# Patient Record
Sex: MALE | Race: WHITE | URBAN - METROPOLITAN AREA
[De-identification: names, ages, dates, MRNs, and addresses within clinical notes are randomized per-mention and may not be internally consistent; named-entity substitution may affect disease eponyms.]

---

## 2017-07-10 ENCOUNTER — TRANSFERRED RECORDS (OUTPATIENT)
Dept: HEALTH INFORMATION MANAGEMENT | Facility: CLINIC | Age: 37
End: 2017-07-10

## 2017-07-20 ENCOUNTER — TRANSFERRED RECORDS (OUTPATIENT)
Dept: HEALTH INFORMATION MANAGEMENT | Facility: CLINIC | Age: 37
End: 2017-07-20

## 2017-07-21 ENCOUNTER — APPOINTMENT (OUTPATIENT)
Dept: CT IMAGING | Facility: CLINIC | Age: 37
End: 2017-07-21
Payer: MEDICAID

## 2017-07-21 ENCOUNTER — HOSPITAL ENCOUNTER (EMERGENCY)
Facility: CLINIC | Age: 37
Discharge: OTHER INSTITUTION | End: 2017-07-21
Attending: INTERNAL MEDICINE | Admitting: INTERNAL MEDICINE
Payer: MEDICAID

## 2017-07-21 VITALS
OXYGEN SATURATION: 96 % | DIASTOLIC BLOOD PRESSURE: 78 MMHG | SYSTOLIC BLOOD PRESSURE: 112 MMHG | RESPIRATION RATE: 16 BRPM | HEART RATE: 85 BPM | TEMPERATURE: 97.3 F

## 2017-07-21 DIAGNOSIS — R51.9 FACIAL PAIN: ICD-10-CM

## 2017-07-21 DIAGNOSIS — F10.920 ALCOHOL INTOXICATION, UNCOMPLICATED (H): ICD-10-CM
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
## 2017-07-21 DIAGNOSIS — F10.220 ALCOHOL DEPENDENCE WITH UNCOMPLICATED INTOXICATION (H): ICD-10-CM

## 2017-07-21 DIAGNOSIS — F11.20 UNCOMPLICATED OPIOID DEPENDENCE (H): ICD-10-CM

## 2017-07-21 LAB
ALCOHOL BREATH TEST: 0.1 (ref 0–0.01)
AMPHETAMINES UR QL SCN: NORMAL
BARBITURATES UR QL: NORMAL
BENZODIAZ UR QL: NORMAL
CANNABINOIDS UR QL SCN: NORMAL
COCAINE UR QL: NORMAL
ETHANOL UR QL SCN: NORMAL
OPIATES UR QL SCN: NORMAL

## 2017-07-21 PROCEDURE — 80320 DRUG SCREEN QUANTALCOHOLS: CPT | Performed by: EMERGENCY MEDICINE

## 2017-07-21 PROCEDURE — 80307 DRUG TEST PRSMV CHEM ANLYZR: CPT | Performed by: EMERGENCY MEDICINE

## 2017-07-21 PROCEDURE — 99284 EMERGENCY DEPT VISIT MOD MDM: CPT | Mod: GC | Performed by: INTERNAL MEDICINE

## 2017-07-21 PROCEDURE — 99285 EMERGENCY DEPT VISIT HI MDM: CPT | Mod: 25 | Performed by: INTERNAL MEDICINE

## 2017-07-21 PROCEDURE — 80307 DRUG TEST PRSMV CHEM ANLYZR: CPT | Mod: 59 | Performed by: EMERGENCY MEDICINE

## 2017-07-21 PROCEDURE — 70450 CT HEAD/BRAIN W/O DYE: CPT

## 2017-07-21 PROCEDURE — 82075 ASSAY OF BREATH ETHANOL: CPT | Performed by: INTERNAL MEDICINE

## 2017-07-21 ASSESSMENT — ENCOUNTER SYMPTOMS
SORE THROAT: 0
HEADACHES: 1
WEAKNESS: 0
MYALGIAS: 0
DIARRHEA: 0
ABDOMINAL PAIN: 0
NAUSEA: 0
BRUISES/BLEEDS EASILY: 0
NUMBNESS: 0
DYSPHORIC MOOD: 0
HEMATURIA: 0
ADENOPATHY: 0
PALPITATIONS: 0
CONSTIPATION: 0
ARTHRALGIAS: 0
CHILLS: 0
DYSURIA: 0
COUGH: 0
SHORTNESS OF BREATH: 0
FEVER: 0
EYE PAIN: 0
VOMITING: 0

## 2017-07-21 NOTE — DISCHARGE INSTRUCTIONS
"You were seen in the Emergency Department today for concerns of alcohol dependence. Because you hit your head this morning, we obtained a head CT, which was normal. We have arranged a Detox bed for you at [address]. You should follow up afterwards to obtain a Rule 25 assessment.        Alcohol Intoxication  Alcohol intoxication occurs when you drink alcohol faster than your liver can remove it from your system. The following facts are important to remember:    It can take 10 minutes or more to start to feel the effects of a drink, so you can easily get more intoxicated than you intended.    One drink may be more than 1 serving of alcohol. Depending on the drink, it can be 2 to 4 servings.    It takes about an hour for your body to metabolize (clear) 1 serving. If you have more than 1 drink, it can take a couple of hours or more.    Many things affect how drinks will affect you, including whether you ve eaten, how fast you drink, your size, how much you normally drink (or not), medicines you take, chronic diseases you have, and gender.  Signs and symptoms of alcohol poisoning  The following are signs and symptoms of alcohol poisoning:  Mild impairment    Reduced inhibitions    Slurred speech    Drowsiness    Decreased fine motor skills  Moderate impairment    Erratic behavior, aggression, depression    Impaired judgment    Confusion    Concentration difficulties    Coordination problems  Severe impairment    Vomiting    Seizures    Unconsciousness    Cold, clammy    Slow or irregular breathing    Hypothermia (low body temperature)    Coma  Health effects  Alcohol abuse causes health problems. Sometimes this can happen after only drinking a  little.\" There is no set number of drinks or amount of alcohol that defines too much. The more you drink at one time, and the more frequently you drink determine both the short-term and long-term health effects. It affects all parts of your body and your health, including " your:    Brain. Alcohol is a central nervous system depressant. It can damage parts of the brain that affect your balance, memory, thinking, and emotions. It can cause memory loss, blackouts, depression, agitation, sleep cycle changes, and seizures. These changes may or may not be reversible.    Heart and vascular system. Alcohol affects multiple areas. It can damage heart muscle causing cardiomyopathy, which is a weakening and stretching of the heart muscle. This can lead to trouble breathing, an irregular heartbeat, atrial fibrillation, leg swelling, and heart failure. It makes the blood vessels stiffen causing hypertension (high blood pressure). All of these problems increase your risk of having heart attacks or strokes.    Liver. Alcohol causes fat to build up in the liver, affecting its normal function. This increases the risk for hepatitis, leading to abdominal pain, appetite loss, jaundice, bleeding problems, liver fibrosis, and cirrhosis. This in turn can affect your ability to fight off infections, and can cause diabetes. The liver changes prevent it from removing toxins in your blood that can cause encephalopathy. Signs of this are confusion, altered level of consciousness, personality changes, memory loss, seizures, coma, and death.    Pancreas. Alcohol can cause inflammation of the pancreas, or pancreatitis. This can cause pain in your abdomen, fever, and diabetes.    Immune system. Alcohol weakens your immune system in a number of ways. It suppresses your immune system making it harder to fight off infections and colds. You will also have a higher risk of certain infections like pneumonia and tuberculosis.    Cancer risk. Alcohol raises your risk of cancer of the mouth, esophagus, pharynx, larynx, liver, and breast.    Sexual function. Alcohol abuse can also lead to sexual problems.  Alcohol use during pregnancy may cause permanent damage to the growing baby.  Home care  The following guidelines will  help you care for yourself at home:    Don't drink any more alcohol.    Don't drive until all effects of the alcohol have worn off.    Don't operate machinery that can cause injuries.    Get lots of rest over the next few days. Drink plenty of water and other non-alcoholic liquids. Try to eat regular meals.    If you have been drinking heavily on a daily basis, you may go through alcohol withdrawal. The usual symptoms last 3 to 4 days and may include nervousness, shakiness, nausea, sweating, sleeplessness, and can even cause seizures and a serious withdrawal symptom called delirium tremens, or DTs. During this time, it is best that you stay with family or friends who can help and support you. You can also admit yourself to a residential detox program. If your symptoms are severe (seizures, severe shakiness, confusion), contact your doctor or call an ambulance for help (see below).   Follow-up care  If alcohol is a problem in your life, these are some organizations that can help you:    Alcoholics Anonymous offers support through a self-help fellowship. There are no dues or fees. See the Yellow Pages and call for time and place of meetings. Find AA online at www.aa.org.    [employer] offers support to families of alcohol users. Contact 361-460-9021, or online at www.al-anon.org.    National [place] on Alcoholism and Drug Dependence can be reached at 845-971-1120, or online at www.ncadd.org.    There are also inpatient and residential alcohol detox programs. Check the Internet or phonebook Yellow Pages under  Drug Abuse and Treatment Centers.   Call 911  Call 911 if any of these occur:    Trouble breathing or slow irregular breathing    Chest pain    Sudden weakness on one side of your body or sudden trouble speaking    Heavy bleeding or vomiting blood    Very drowsy or trouble awakening    Fainting or loss of consciousness    Rapid heart rate    Seizure  When to seek medical advice  Call your healthcare provider right  away if any of these occur:    Severe shakiness     Fever over 100.4  F (38.0  C)    Confusion or hallucinations (seeing, hearing, or feeling things that are not there)    Pain in your upper abdomen that gets worse    Repeated vomiting  Date Last Reviewed: 6/1/2016 2000-2017 [employer]. [address] [place] [address]. All rights reserved. This information is not intended as a substitute for professional medical care. Always follow your healthcare professional's instructions.

## 2017-07-21 NOTE — ED PROVIDER NOTES
History     Chief Complaint   Patient presents with     Alcohol Intoxication     HPI  Sanket Jacques is a 37 year old male with a history of alcohol and opiate dependence who presents seeking detox from alcohol.    The patient has a long history history of alcohol use, starting at age 12. He is currently drinking 1 L of vodka per day. He was seen at [facility] [place] [place] x2 yesterday for alcohol intoxication, although the patient cannot recall the details of these visits. Per his report, he was discharged with instructions to obtain a Rule 25. He went to [facility] for a Rule 25, but he arrived too late to be seen. This frustrated him, and he left to drink alcohol. After he became intoxicated, he walked into a clinic and sat on the floor. The staff at the clinic called EMS, and he was brought to the ED. He estimates that his last alcohol use was at 0900. He denies anxiety, diaphoresis, tremors, or any withdrawal symptoms.     He was told in the past by a friend that he had a seizure, but he is unsure of the details. He has no known history of delirium tremens. He has been through CD treatment 2-3 times in the past for polysubstance dependence. He was sober from alcohol for one month in 01/2017 until 02/2017. Prior to that, he had a 6-7 month stretch of sobriety. He has a history of opiate dependence, and his last opiate use was 8 days ago. He has injected heroin once, but he typically snorts opiates. He has tried other substances in the past, but he doesn't use anything else with regularlity. His last use of another substance was Xanax two days ago. He thinks he fell and hit his head this morning after becoming intoxicated. He currently has a headache, and he denies numbness or weakness.     I have reviewed the Medications, Allergies, Past Medical and Surgical History, and Social History in the Epic system.    Review of Systems   Constitutional: Negative for chills and fever.   HENT: Negative for congestion and sore throat.     Eyes: Negative for pain and visual disturbance.   Respiratory: Negative for cough and shortness of breath.    Cardiovascular: Negative for chest pain and palpitations.   Gastrointestinal: Negative for abdominal pain, constipation, diarrhea, nausea and vomiting.   Genitourinary: Negative for dysuria and hematuria.   Musculoskeletal: Negative for arthralgias and myalgias.   Neurological: Positive for headaches. Negative for weakness and numbness.        Positive for mild bilateral hand tremor (baseline)   Hematological: Negative for adenopathy. Does not bruise/bleed easily.   Psychiatric/Behavioral: Negative for dysphoric mood and suicidal ideas.       Physical Exam   BP: 107/61  Pulse: 79  Temp: 98  F (36.7  C)  Resp: 16  SpO2: 96 %  Physical Exam   Constitutional: He is oriented to person, place, and time.   Laying in bed reading a book, appears stated age, comfortable   HENT:   Head: Normocephalic and atraumatic.   Eyes: Conjunctivae and EOM are normal.   Neck: Neck supple.   Cardiovascular: Normal rate, regular rhythm and normal heart sounds.    Pulmonary/Chest: Effort normal and breath sounds normal. No respiratory distress.   Abdominal: Soft. Bowel sounds are normal. He exhibits no distension. There is no tenderness.   Musculoskeletal: Normal range of motion.   Neurological: He is alert and oriented to person, place, and time. No cranial nerve deficit. Coordination normal.   Strength 5/5 throughout. Mild resting tremor in hands bilaterally   Skin: Skin is warm and dry. He is not diaphoretic.   Psychiatric:   Slurred speech, somewhat tangential thought process, appears intoxicated       ED Course     ED Course     Procedures          Labs Ordered and Resulted from Time of ED Arrival Up to the Time of Departure from the ED   ALCOHOL BREATH TEST POCT - Abnormal; Notable for the following:        Result Value    Alcohol Breath Test 0.097 (*)     All other components within normal limits   DRUG ABUSE SCREEN 6 CHEM  DEP URINE ([facility])     Results for orders placed or performed during the hospital encounter of 07/21/17   Head CT w/o contrast    Narrative    CT HEAD W/O CONTRAST  7/21/2017 3:40 PM    HISTORY: Head trauma. Pain.    TECHNIQUE: Scans were obtained through the head without IV contrast.   Radiation dose for this scan was reduced using automated exposure  control, adjustment of the mA and/or kV according to patient size, or  iterative reconstruction technique.    COMPARISON: None.    FINDINGS: No hemorrhage, mass lesion, or focal area of acute  infarction identified. Tiny cyst or polyp in the left maxillary  antrum.. Prior left-sided mastoidectomy with clear mastoid bowl..      Impression    IMPRESSION:   1. Brain normal.  2. Prior left mastoidectomy.    KARTHIK RAPP MD   Drug abuse screen 6 urine (chem dep)   Result Value Ref Range    Amphetamine Qual Urine  NEG     Negative   Cutoff for a negative amphetamine is 500 ng/mL or less.      Barbiturates Qual Urine  NEG     Negative   Cutoff for a negative barbiturate is 200 ng/mL or less.      Benzodiazepine Qual Urine  NEG     Negative   Cutoff for a negative benzodiazepine is 200 ng/mL or less.      Cannabinoids Qual Urine  NEG     Negative   Cutoff for a negative cannabinoid is 50 ng/mL or less.      Cocaine Qual Urine  NEG     Negative   Cutoff for a negative cocaine is 300 ng/mL or less.      Ethanol Qual Urine  NEG     Negative   Cutoff for a negative urine ethanol is 0.05 g/dL or less      Opiates Qualitative Urine  NEG     Negative   Cutoff for a negative opiate is 300 ng/mL or less.     Alcohol breath test POCT   Result Value Ref Range    Alcohol Breath Test 0.097 (A) 0.00 - 0.01          Assessments & Plan (with Medical Decision Making)   Sanket Jacques is a 37 year old male with a history of alcohol and opiate dependence who presents seeking detox from alcohol. The patient reports that his current alcohol use is 1 L vodka/day. His last drink was approximately  0900. ETOH breath test was 0.097. Utox was negative. He is requesting detox, with the intent to obtain a Rule 25 afterwards. He has a historical diagnosis of bipolar disorder type 2. He denies any suicidal ideation. His VS were wnl. He clinically appears intoxicated, and he denies withdrawal symptoms. He has a mild resting tremor, which he reports is his baseline. He was accepted for a bed at [address] [place].      The patient states that he thinks he fell and hit his head this morning after becoming intoxicated, and he currently has a headache. There is no trauma identified on exam, and his neuro exam was normal. Given his possible head injury and current intoxication, we obtained a head CT, which returned as normal.    Plan:   -Detox at [address] [place]   -Rule 25 after detox      Brandon Cole MD, PGY1    I have reviewed the nursing notes.    I have reviewed the findings, diagnosis, plan and need for follow up with the patient.  This data collected with the Resident working in the Emergency Department.  Patient was seen and evaluated by myself and I repeated the history and physical exam with the patient.  The plan of care was discussed with them.  The key portions of the note including the entire assessment and plan reflect my documentation.         New Prescriptions    No medications on file       Final diagnoses:   Alcohol intoxication, uncomplicated (H)       7/21/2017   [facility], [place], EMERGENCY DEPARTMENT     Xena Pineda MD  07/21/17 1600

## 2017-07-21 NOTE — ED AVS SNAPSHOT
[facility], [place], Emergency Department    [address]    [place] [address]    Phone:  [phone]    Fax:  [phone]                                       Sanket Jacques   MRN: [id number]    Department:  [facility], Emergency Department   Date of Visit:  7/21/2017           After Visit Summary Signature Page     I have received my discharge instructions, and my questions have been answered. I have discussed any challenges I see with this plan with the nurse or doctor.    ..........................................................................................................................................  Patient/Patient Representative Signature      ..........................................................................................................................................  Patient Representative Print Name and Relationship to Patient    ..................................................               ................................................  Date                                            Time    ..........................................................................................................................................  Reviewed by Signature/Title    ...................................................              ..............................................  Date                                                            Time

## 2017-07-21 NOTE — ED AVS SNAPSHOT
[facility], Emergency Department    [address]    [place] [address]    Phone:  [phone]    Fax:  [phone]                                       Sanket Jacques   MRN: [id number]    Department:  [facility], Emergency Department   Date of Visit:  7/21/2017           Patient Information     Date Of Birth          1980        Your diagnoses for this visit were:     Alcohol intoxication, uncomplicated (H)        You were seen by Xena Pineda MD.        Discharge Instructions       You were seen in the Emergency Department today for concerns of alcohol dependence. Because you hit your head this morning, we obtained a head CT, which was normal. We have arranged a Detox bed for you at [address]. You should follow up afterwards to obtain a Rule 25 assessment.        Alcohol Intoxication  Alcohol intoxication occurs when you drink alcohol faster than your liver can remove it from your system. The following facts are important to remember:    It can take 10 minutes or more to start to feel the effects of a drink, so you can easily get more intoxicated than you intended.    One drink may be more than 1 serving of alcohol. Depending on the drink, it can be 2 to 4 servings.    It takes about an hour for your body to metabolize (clear) 1 serving. If you have more than 1 drink, it can take a couple of hours or more.    Many things affect how drinks will affect you, including whether you ve eaten, how fast you drink, your size, how much you normally drink (or not), medicines you take, chronic diseases you have, and gender.  Signs and symptoms of alcohol poisoning  The following are signs and symptoms of alcohol poisoning:  Mild impairment    Reduced inhibitions    Slurred speech    Drowsiness    Decreased fine motor skills  Moderate impairment    Erratic behavior, aggression, depression    Impaired judgment    Confusion    Concentration difficulties    Coordination problems  Severe  "impairment    Vomiting    Seizures    Unconsciousness    Cold, clammy    Slow or irregular breathing    Hypothermia (low body temperature)    Coma  Health effects  Alcohol abuse causes health problems. Sometimes this can happen after only drinking a  little.\" There is no set number of drinks or amount of alcohol that defines too much. The more you drink at one time, and the more frequently you drink determine both the short-term and long-term health effects. It affects all parts of your body and your health, including your:    Brain. Alcohol is a central nervous system depressant. It can damage parts of the brain that affect your balance, memory, thinking, and emotions. It can cause memory loss, blackouts, depression, agitation, sleep cycle changes, and seizures. These changes may or may not be reversible.    Heart and vascular system. Alcohol affects multiple areas. It can damage heart muscle causing cardiomyopathy, which is a weakening and stretching of the heart muscle. This can lead to trouble breathing, an irregular heartbeat, atrial fibrillation, leg swelling, and heart failure. It makes the blood vessels stiffen causing hypertension (high blood pressure). All of these problems increase your risk of having heart attacks or strokes.    Liver. Alcohol causes fat to build up in the liver, affecting its normal function. This increases the risk for hepatitis, leading to abdominal pain, appetite loss, jaundice, bleeding problems, liver fibrosis, and cirrhosis. This in turn can affect your ability to fight off infections, and can cause diabetes. The liver changes prevent it from removing toxins in your blood that can cause encephalopathy. Signs of this are confusion, altered level of consciousness, personality changes, memory loss, seizures, coma, and death.    Pancreas. Alcohol can cause inflammation of the pancreas, or pancreatitis. This can cause pain in your abdomen, fever, and diabetes.    Immune system. Alcohol " weakens your immune system in a number of ways. It suppresses your immune system making it harder to fight off infections and colds. You will also have a higher risk of certain infections like pneumonia and tuberculosis.    Cancer risk. Alcohol raises your risk of cancer of the mouth, esophagus, pharynx, larynx, liver, and breast.    Sexual function. Alcohol abuse can also lead to sexual problems.  Alcohol use during pregnancy may cause permanent damage to the growing baby.  Home care  The following guidelines will help you care for yourself at home:    Don't drink any more alcohol.    Don't drive until all effects of the alcohol have worn off.    Don't operate machinery that can cause injuries.    Get lots of rest over the next few days. Drink plenty of water and other non-alcoholic liquids. Try to eat regular meals.    If you have been drinking heavily on a daily basis, you may go through alcohol withdrawal. The usual symptoms last 3 to 4 days and may include nervousness, shakiness, nausea, sweating, sleeplessness, and can even cause seizures and a serious withdrawal symptom called delirium tremens, or DTs. During this time, it is best that you stay with family or friends who can help and support you. You can also admit yourself to a residential detox program. If your symptoms are severe (seizures, severe shakiness, confusion), contact your doctor or call an ambulance for help (see below).   Follow-up care  If alcohol is a problem in your life, these are some organizations that can help you:    Alcoholics Anonymous offers support through a self-help fellowship. There are no dues or fees. See the Yellow Pages and call for time and place of meetings. Find AA online at www.aa.org.    [employer] offers support to families of alcohol users. Contact 535-212-3783, or online at www.al-anodameon.org.    National [place] on Alcoholism and Drug Dependence can be reached at 567-099-4996, or online at www.ncadd.org.    There are also  inpatient and residential alcohol detox programs. Check the Internet or phonebook Yellow Pages under  Drug Abuse and Treatment Centers.   Call 911  Call 911 if any of these occur:    Trouble breathing or slow irregular breathing    Chest pain    Sudden weakness on one side of your body or sudden trouble speaking    Heavy bleeding or vomiting blood    Very drowsy or trouble awakening    Fainting or loss of consciousness    Rapid heart rate    Seizure  When to seek medical advice  Call your healthcare provider right away if any of these occur:    Severe shakiness     Fever over 100.4  F (38.0  C)    Confusion or hallucinations (seeing, hearing, or feeling things that are not there)    Pain in your upper abdomen that gets worse    Repeated vomiting  Date Last Reviewed: 6/1/2016 2000-2017 [employer]. [address] [place] [address], [place] [address]. All rights reserved. This information is not intended as a substitute for professional medical care. Always follow your healthcare professional's instructions.          24 Hour Appointment Hotline       To make an appointment at any [facility] (formerly [facility]), call [phone] ([phone]). If you don't have a family doctor or clinic, we will help you find one. [facility] clinics are conveniently located to serve the needs of you and your family.             Review of your medicines      Our records show that you are taking the medicines listed below. If these are incorrect, please call your family doctor or clinic.        Dose / Directions Last dose taken    citalopram 10 MG tablet   Commonly known as:  celeXA   Dose:  10 mg   Quantity:  30 tablet        Take 1 tablet by mouth daily.   Refills:  3        HYDROcodone-acetaminophen 5-325 MG per tablet   Commonly known as:  NORCO   Dose:  1 tablet   Quantity:  20 tablet        Take 1 tablet by mouth every 6 hours as needed for pain. Last refill.  Needs to be seen in clinic for recheck for next refill.   Refills:  0         "oxyCODONE-acetaminophen 5-325 MG per tablet   Commonly known as:  PERCOCET   Dose:  1-2 tablet   Quantity:  20 tablet        Take 1-2 tablets by mouth every 6 hours as needed for pain.   Refills:  0                Procedures and tests performed during your visit     Alcohol breath test POCT    Drug abuse screen 6 urine (chem dep)    Head CT w/o contrast      Orders Needing Specimen Collection     None      Pending Results     No orders found from 2017 to 2017.            Pending Culture Results     No orders found from 2017 to 2017.            Pending Results Instructions     If you had any lab results that were not finalized at the time of your Discharge, you can call the ED Lab Result RN at [phone]. You will be contacted by this team for any positive Lab results or changes in treatment. The nurses are available 7 days a week from 10A to 6:30P.  You can leave a message 24 hours per day and they will return your call.        Thank you for choosing [facility]       Thank you for choosing [facility] for your care. Our goal is always to provide you with excellent care. Hearing back from our patients is one way we can continue to improve our services. Please take a few minutes to complete the written survey that you may receive in the mail after you visit with us. Thank you!        [facility] Information     [employer] lets you send messages to your doctor, view your test results, renew your prescriptions, schedule appointments and more. To sign up, go to [email or web address] . Click on \"Log in\" on the left side of the screen, which will take you to the Welcome page. Then click on \"Sign up Now\" on the right side of the page.     You will be asked to enter the access code listed below, as well as some personal information. Please follow the directions to create your username and password.     Your access code is: [email or web address]  Expires: 10/19/2017  4:30 PM     Your access code will  in 90 days. If " you need help or a new code, please call your [facility] clinic or [phone].        Care EveryWhere ID     This is your Care EveryWhere ID. This could be used by other organizations to access your [facility] medical records  [id number]        Equal Access to Services     ZACHARY LANGFORD : Kiley Tejada, harish martinez, qaelias kaalmanereida haro, lokesh abarca. So [facility] [phone].    ATENCIÓN: Si habla español, tiene a haji disposición servicios gratuitos de asistencia lingüística. Llame al [phone].    We comply with applicable federal civil rights laws and [place] laws. We do not discriminate on the basis of race, color, national origin, age, disability sex, sexual orientation or gender identity.            After Visit Summary       This is your record. Keep this with you and show to your community pharmacist(s) and doctor(s) at your next visit.

## 2017-07-21 NOTE — ED NOTES
"Paramedics stated, \"Patient was discharged from [facility] twice yesterday to a treatment Center on [facility]. According to patient, \"I arrived 5 minutes late for rule 25. I was asked to return in am.I  Went there today, I was told they were book for the day and to return on Monday.\" Pt walk to a near by clinic after drinking and sat on the floor. Paramedics were called to take patient to ER.  "

## 2017-12-14 ENCOUNTER — RECORDS - HEALTHEAST (OUTPATIENT)
Dept: ADMINISTRATIVE | Facility: OTHER | Age: 37
End: 2017-12-14

## 2017-12-18 ENCOUNTER — RECORDS - HEALTHEAST (OUTPATIENT)
Dept: LAB | Facility: CLINIC | Age: 37
End: 2017-12-18

## 2017-12-18 LAB
CHOLEST SERPL-MCNC: 152 MG/DL
FASTING STATUS PATIENT QL REPORTED: NORMAL
HDLC SERPL-MCNC: 43 MG/DL
LDLC SERPL CALC-MCNC: 84 MG/DL
TRIGL SERPL-MCNC: 123 MG/DL

## 2018-01-06 ENCOUNTER — RECORDS - HEALTHEAST (OUTPATIENT)
Dept: LAB | Facility: CLINIC | Age: 38
End: 2018-01-06

## 2018-01-06 LAB
ALBUMIN UR-MCNC: NEGATIVE MG/DL
AMPHETAMINES UR QL SCN: ABNORMAL
APPEARANCE UR: CLEAR
BARBITURATES UR QL: ABNORMAL
BENZODIAZ UR QL: ABNORMAL
BILIRUB UR QL STRIP: NEGATIVE
CANNABINOIDS UR QL SCN: ABNORMAL
COCAINE UR QL: ABNORMAL
COLOR UR AUTO: NORMAL
CREAT UR-MCNC: 25.2 MG/DL
GLUCOSE UR STRIP-MCNC: NEGATIVE MG/DL
HGB UR QL STRIP: NEGATIVE
KETONES UR STRIP-MCNC: NEGATIVE MG/DL
LEUKOCYTE ESTERASE UR QL STRIP: NEGATIVE
METHADONE UR QL SCN: ABNORMAL
NITRATE UR QL: NEGATIVE
OPIATES UR QL SCN: ABNORMAL
OXYCODONE UR QL: ABNORMAL
PCP UR QL SCN: ABNORMAL
PH UR STRIP: 7.5 [PH] (ref 4.5–8)
SP GR UR STRIP: 1.01 (ref 1–1.03)
UROBILINOGEN UR STRIP-ACNC: NORMAL

## 2018-01-07 LAB — BACTERIA SPEC CULT: NO GROWTH

## 2018-04-08 ENCOUNTER — HOSPITAL ENCOUNTER (EMERGENCY)
Facility: CLINIC | Age: 38
Discharge: HOME | End: 2018-04-08
Attending: EMERGENCY MEDICINE | Admitting: EMERGENCY MEDICINE
Payer: COMMERCIAL

## 2018-04-08 VITALS
HEIGHT: 72 IN | SYSTOLIC BLOOD PRESSURE: 104 MMHG | DIASTOLIC BLOOD PRESSURE: 90 MMHG | WEIGHT: 180 LBS | BODY MASS INDEX: 24.38 KG/M2 | TEMPERATURE: 98 F | HEART RATE: 98 BPM | RESPIRATION RATE: 15 BRPM | OXYGEN SATURATION: 98 %

## 2018-04-08 DIAGNOSIS — F91.9 DESTRUCTIVE BEHAVIOR: ICD-10-CM

## 2018-04-08 DIAGNOSIS — R44.3 HALLUCINATIONS: ICD-10-CM

## 2018-04-08 DIAGNOSIS — F10.929 ALCOHOLIC INTOXICATION WITH COMPLICATION (H): ICD-10-CM

## 2018-04-08 PROCEDURE — 99285 EMERGENCY DEPT VISIT HI MDM: CPT | Mod: 25

## 2018-04-08 PROCEDURE — 82075 ASSAY OF BREATH ETHANOL: CPT

## 2018-04-08 PROCEDURE — 25000132 ZZH RX MED GY IP 250 OP 250 PS 637: Performed by: EMERGENCY MEDICINE

## 2018-04-08 PROCEDURE — 90791 PSYCH DIAGNOSTIC EVALUATION: CPT

## 2018-04-08 RX ORDER — IBUPROFEN 600 MG/1
600 TABLET, FILM COATED ORAL ONCE
Status: COMPLETED | OUTPATIENT
Start: 2018-04-08 | End: 2018-04-08

## 2018-04-08 RX ORDER — OLANZAPINE 10 MG/1
10 TABLET, ORALLY DISINTEGRATING ORAL
Status: DISCONTINUED | OUTPATIENT
Start: 2018-04-08 | End: 2018-04-08 | Stop reason: CLARIF

## 2018-04-08 RX ADMIN — IBUPROFEN 600 MG: 600 TABLET ORAL at 15:28

## 2018-04-08 NOTE — ED NOTES
Bed removed from the patient's room due to behavior, mattress placed on the floor, pillow and blankets provided.  The patient was standing on the stretcher, attempting to get at ceiling tiles.

## 2018-04-08 NOTE — ED NOTES
Pt easily awakened from sleep. Calm and cooperative at this time. Pt requesting water. Offered food but states he only wants crackers due to not having an appetite. Pt states he would speak with DEC.

## 2018-04-08 NOTE — ED NOTES
"Pt allowed this RN to slightly open door and speak with him. Told pt where he was and asked how he was doing. Pt stated he was \"fine\". Offered pt breakfast, food or water but pt declined. Pt states he thinks the alcohol has worn off but wants the \"hallucinogen he was given by the evil-doers\" to wear off. Offered pt Zyprexa to help with the hallucinations but pt refused. Told pt I would check on him again in 30 minutes.  "

## 2018-04-08 NOTE — ED AVS SNAPSHOT
Emergency Department    [address] [place] [address]    Phone:  [phone]    Fax:  [phone]                                       Sanket Jacques   MRN: [id number]    Department:   Emergency Department   Date of Visit:  4/8/2018           After Visit Summary Signature Page     I have received my discharge instructions, and my questions have been answered. I have discussed any challenges I see with this plan with the nurse or doctor.    ..........................................................................................................................................  Patient/Patient Representative Signature      ..........................................................................................................................................  Patient Representative Print Name and Relationship to Patient    ..................................................               ................................................  Date                                            Time    ..........................................................................................................................................  Reviewed by Signature/Title    ...................................................              ..............................................  Date                                                            Time

## 2018-04-08 NOTE — ED NOTES
The patient left his room and was walking around the corridor.  He then walked over to the exit door and attempted to open the door, finding it locked he pulled the electronic  off the wall, throwing it to the floor, exposing its wires.  Security escorted the patient away from the door and he was assessed by the MD.  The patient was further escorted to his room because he would not agree to stay away from the exit doors.  He will be secluded in his room until he can contract for safety.

## 2018-04-08 NOTE — ED PROVIDER NOTES
Patient signed out to me by Dr. Dejesus pending DEC evaluation.   +ETOH and delusional.  Possible drug use    Needs DEC evaluation once cooperative.    DEC evaluated the patient and now clinically sober with no delusions or hallucinations.   Patient seen by me and clinically sober. No hallucinations. Calm and cooperative. Coherent thought process. Is going to take bus home.  Declines resources by Chichi Singh MD  04/08/18 7760

## 2018-04-08 NOTE — ED PROVIDER NOTES
"  History     Chief Complaint:  Alcohol Intoxication    The history is provided by the patient and the police. The history is limited by the condition of the patient.      Sanket Jacques is a 37 year old male with a history of anxiety who presents to the emergency department today by PD for evaluation of alcohol intoxication. According to PD report, the patient was seen vandalizing security camera equipment at [facility] by pulling out wires and police was subsequently called to have him arrested. He was given a ticket by police and escorted to the emergency department for evaluation because he states he is hallucinating. His alcohol level was 0.20. While in the emergency department, the patient pulled out the badge reader from the wall near his room. He admits to drinking alcohol, but does not know how much. He repeatedly says, \"I'm tripping balls,\" and believes that he was \"roofied\" by someone.  He denies taking other illicit drugs.  Of note, the patient was recently seen at [facility] on 4/5 and 4/6 by the Acute Psychiatric Service.  They noted that he reports a history of bipolar disorder and that after evaluation he was discharged to his girlfriend's apartment.  He is homeless by their report with a history of drug and alcohol abuse. His urine drug screen on 4/6 was positive for amphetamines, benzodiazepines, THC, and Fentanyl. He was evaluated and discharged at that time.    Allergies:  Drug allergies reviewed. No pertinent drug allergies.     Medications:    Medications reviewed. No pertinent medications.     Past Medical History:    Acne vulgaris   Hearing loss in left ear   Migraine headaches   Seasonal allergies   Unspecified asthma    Past Surgical History:    Adenoidectomy  PE tubes  LT inner ear CSF leak repair    Family History:    Family history reviewed. No pertinent family history.     Social History:  The patient was accompanied to the ED by police.  Smoking Status: Current " "Every Day Smoker  Smokeless Tobacco: Never Used  Alcohol Use: Positive; Liter of Vodka  Marital Status:  Single     Review of Systems   Unable to perform ROS: Other     Physical Exam     Patient Vitals for the past 24 hrs:   BP Temp Pulse Resp SpO2 Height Weight   04/08/18 0146 113/80 98  F (36.7  C) 98 15 98 % 1.829 m (6') 81.6 kg (180 lb)      Physical Exam  General: Well-nourished, no acute distress  Eyes: PERRL, conjunctivae pink no scleral icterus or conjunctival injection  ENT:  Moist mucus membranes  Respiratory:  No respiratory distress  CV: Normal rate   Skin: Warm, dry.  No rashes or petechiae  Musculoskeletal: No peripheral edema or calf tenderness  Neuro: Alert and oriented to person/place.  Poorly cooperative but no apparent cranial nerve deficits.  Normal gait.  Normal speech without slurring.   Psychiatric: Intoxicated affect.  Physical appearance, attire: Appears stated age. Hygiene adequately groomed. Eye contact at examiner poor. Speech regular, speech volume regular, speech quality fluid. Cognitive, states \"I'm tripping balls.\" Judgment poor, insight poor. Orientation to time, place, person and situation. Thought illogical. Hallucinations: None apparent on exam but patient states he is hallucinating. Psychomotor activity: No problem noted. Gait: No problem.     Emergency Department Course       Emergency Department Course:    Nursing notes and vitals reviewed.    0155 I performed an exam of the patient as documented above.     0200 While I was in the [facility] access hospital staff area of behavioral health, the patient walked out into the [facility] and tore the badge reader off of the North door and ripped wires out of wall.  He had been cooperative and gave no indication that he would destroy property prior to this.  We explained to him that he would need to be in seclusion in his room given that he is being destructive to property and risk of further property destruction or injury to self and staff.  He was " placed in seclusion. In person face to face assessment completed, including an evaluation of the patient's immediate reaction to the intervention, complete review of systems assessment, behavioral assessment and review/assessment of history, drugs and medications, recent labs, etc., and behavioral condition.  The patient experienced: No adverse physical outcome from seclusion/restraint initiation.  The intervention of restraint or seclusion needs to continue.    I signed out to Dr. Dejesus.    Impression & Plan      Medical Decision Making:  Sanket Jacques is a 37 year old male who presents to the emergency department today for evaluation of alcohol intoxication and hallucinations.  It is unclear if his presentation is due to alcohol intoxication alone and aversion to arrest or if there is a psychiatric component given his bipolar disorder and report of hallucinations.  There may also be meth psychosis. He has been seen for a psychiatric evaluation at [facility] very recently, raising concern for acute psychosis, and at that time had drug screen showed amphetamines concerning for meth abuse in addition to other positives. He was placed in seclusion after he ripped out a badge reader and rendered this door to the behavioral health suite inoperable.  Given his blood alcohol level, we will monitor him while he krista from the alcohol until the morning and reevaluate to determine disposition.     Diagnosis:    ICD-10-CM    1. Alcoholic intoxication with complication (H) F10.929    2. Hallucinations R44.3    3. Destructive behavior F91.9      Disposition:   Pending    Scribe Disclosure:  Charlene SINGLETON, am serving as a scribe at 1:46 AM on 4/8/2018 to document services personally performed by Rosalie Diego MD, based on my observations and the provider's statements to me.       EMERGENCY DEPARTMENT       Rosalie Diego MD  04/08/18 0620       Rosalie Diego MD  04/08/18 0620

## 2018-04-08 NOTE — DISCHARGE INSTRUCTIONS
*You should drink less alcohol.  *No new medications. Continue your current medications.  *Follow-up with your doctor for a recheck in 2-3 days.  *Return if you change your mind and wish to stop drinking, develop fever, withdrawal symptoms, vomiting, faint or feel like you will faint or become worse in any way.    Discharge Instructions  Alcohol Intoxication    You have been seen today with alcohol intoxication. This means that you have enough alcohol in your system to impair your ability to mentally and physically function. When you are intoxicated, we are not allowed to release you without a sober adult to be with you. You may not drive, operate dangerous equipment, or do anything else dangerous until you are sober.    You may have come to the Emergency Department because of your intoxication, or for another reason, such as because of an injury. No matter what the case is, this visit is a  red flag  regarding alcohol use, and you should consider whether your drinking pattern is a problem for you.     You may be at risk for alcohol-related problems if:      Men: you drink more than 14 drinks per week, or more than 4 drinks per occasion.      Women: you drink more than 7 drinks per week or more than 3 drinks per occasion.      You have black-outs.    You do things you regret while drinking.    You have legal problems because of drinking (DUI).    You have job problems because of drinking (you call in sick to work because of drinking).    CAGE Questions    Have you ever felt you should cut down on your drinking?    Have people annoyed you by criticizing your drinking?    Have you ever felt bad or guilty about your drinking?    Have you ever had a drink first thing in the morning to steady your nerves or get rid of a hangover (eye opener)?    If you answer yes to any of the CAGE questions, you may have a problem with alcohol.      Return to the Emergency Department if:    You become shaky or tremble when you try to  stop drinking.      You have a seizure or pass out.      You throw up (vomit) blood. This may be bright red or it may look like black coffee grounds.      You have blood in the stool. This may be bright red or appear as a black, tarry, bad smelling stool.      You become lightheaded or faint.      For further help, contact:     Your caregiver.      Alcoholics Anonymous (AA).      A drug or alcohol rehabilitation program.      You can get information on alcohol resources and groups by calling the number 900 or 1-191.876.2245 on any phone.       Seek medical care if:    You have persistent vomiting.      You have persistent pain in any part of your body.      You do not feel better after a few days.    If you were given a prescription for medicine here today, be sure to read all of the information (including the package insert) that comes with your prescription.  This will include important information about the medicine, its side effects, and any warnings that you need to know about.  The pharmacist who fills the prescription can provide more information and answer questions you may have about the medicine.  If you have questions or concerns that the pharmacist cannot address, please call or return to the Emergency Department.   Remember that you can always come back to the Emergency Department if you are not able to see your regular doctor in the amount of time listed above, if you get any new symptoms, or if there is anything that worries you.

## 2018-04-08 NOTE — ED AVS SNAPSHOT
Emergency Department    [address] [facility] [address]    Phone:  [phone]    Fax:  [phone]                                       Sanket Jacques   MRN: [id number]    Department:   Emergency Department   Date of Visit:  4/8/2018           Patient Information     Date Of Birth          1980        Your diagnoses for this visit were:     Alcoholic intoxication with complication (H)     Hallucinations     Destructive behavior        You were seen by Rosalie Diego MD, Emma Dejesus MD, and Chichi Miner MD.      Follow-up Information     Follow up with Marge Jordan MD. Schedule an appointment as soon as possible for a visit in 3 days.    Specialty:  Internal Medicine    Contact information:    [facility]  [address] [facility] [address]  [phone]          Discharge Instructions       *You should drink less alcohol.  *No new medications. Continue your current medications.  *Follow-up with your doctor for a recheck in 2-3 days.  *Return if you change your mind and wish to stop drinking, develop fever, withdrawal symptoms, vomiting, faint or feel like you will faint or become worse in any way.    Discharge Instructions  Alcohol Intoxication    You have been seen today with alcohol intoxication. This means that you have enough alcohol in your system to impair your ability to mentally and physically function. When you are intoxicated, we are not allowed to release you without a sober adult to be with you. You may not drive, operate dangerous equipment, or do anything else dangerous until you are sober.    You may have come to the Emergency Department because of your intoxication, or for another reason, such as because of an injury. No matter what the case is, this visit is a  red flag  regarding alcohol use, and you should consider whether your drinking pattern is a problem for you.     You may be at risk for alcohol-related problems if:      Men:  you drink more than 14 drinks per week, or more than 4 drinks per occasion.      Women: you drink more than 7 drinks per week or more than 3 drinks per occasion.      You have black-outs.    You do things you regret while drinking.    You have legal problems because of drinking (DUI).    You have job problems because of drinking (you call in sick to work because of drinking).    CAGE Questions    Have you ever felt you should cut down on your drinking?    Have people annoyed you by criticizing your drinking?    Have you ever felt bad or guilty about your drinking?    Have you ever had a drink first thing in the morning to steady your nerves or get rid of a hangover (eye opener)?    If you answer yes to any of the CAGE questions, you may have a problem with alcohol.      Return to the Emergency Department if:    You become shaky or tremble when you try to stop drinking.      You have a seizure or pass out.      You throw up (vomit) blood. This may be bright red or it may look like black coffee grounds.      You have blood in the stool. This may be bright red or appear as a black, tarry, bad smelling stool.      You become lightheaded or faint.      For further help, contact:     Your caregiver.      Alcoholics Anonymous (AA).      A drug or alcohol rehabilitation program.      You can get information on alcohol resources and groups by calling the number 775 or 1-400.987.6952 on any phone.       Seek medical care if:    You have persistent vomiting.      You have persistent pain in any part of your body.      You do not feel better after a few days.    If you were given a prescription for medicine here today, be sure to read all of the information (including the package insert) that comes with your prescription.  This will include important information about the medicine, its side effects, and any warnings that you need to know about.  The pharmacist who fills the prescription can provide more information and answer  questions you may have about the medicine.  If you have questions or concerns that the pharmacist cannot address, please call or return to the Emergency Department.   Remember that you can always come back to the Emergency Department if you are not able to see your regular doctor in the amount of time listed above, if you get any new symptoms, or if there is anything that worries you.            24 Hour Appointment Hotline       To make an appointment at any [facility], call [phone] ([phone]). If you don't have a family doctor or clinic, we will help you find one. [facility] are conveniently located to serve the needs of you and your family.             Review of your medicines      Our records show that you are taking the medicines listed below. If these are incorrect, please call your family doctor or clinic.        Dose / Directions Last dose taken    citalopram 10 MG tablet   Commonly known as:  celeXA   Dose:  10 mg   Quantity:  30 tablet        Take 1 tablet by mouth daily.   Refills:  3        HYDROcodone-acetaminophen 5-325 MG per tablet   Commonly known as:  NORCO   Dose:  1 tablet   Quantity:  20 tablet        Take 1 tablet by mouth every 6 hours as needed for pain. Last refill.  Needs to be seen in clinic for recheck for next refill.   Refills:  0        oxyCODONE-acetaminophen 5-325 MG per tablet   Commonly known as:  PERCOCET   Dose:  1-2 tablet   Quantity:  20 tablet        Take 1-2 tablets by mouth every 6 hours as needed for pain.   Refills:  0                Procedures and tests performed during your visit     Restraints Violent or Self-Destructive Adult (Age 18 and Older)      Orders Needing Specimen Collection     None      Pending Results     No orders found from 4/6/2018 to 4/9/2018.            Pending Culture Results     No orders found from 4/6/2018 to 4/9/2018.            Pending Results Instructions     If you had any lab results that were not finalized at the time  of your Discharge, you can call the ED Lab Result RN at [phone]. You will be contacted by this team for any positive Lab results or changes in treatment. The nurses are available 7 days a week from 10A to 6:30P.  You can leave a message 24 hours per day and they will return your call.        Test Results From Your Hospital Stay               Clinical Quality Measure: Blood Pressure Screening     Your blood pressure was checked while you were in the emergency department today. The last reading we obtained was  BP: 104/90 . Please read the guidelines below about what these numbers mean and what you should do about them.  If your systolic blood pressure (the top number) is less than 120 and your diastolic blood pressure (the bottom number) is less than 80, then your blood pressure is normal. There is nothing more that you need to do about it.  If your systolic blood pressure (the top number) is 120-139 or your diastolic blood pressure (the bottom number) is 80-89, your blood pressure may be higher than it should be. You should have your blood pressure rechecked within a year by a primary care provider.  If your systolic blood pressure (the top number) is 140 or greater or your diastolic blood pressure (the bottom number) is 90 or greater, you may have high blood pressure. High blood pressure is treatable, but if left untreated over time it can put you at risk for heart attack, stroke, or kidney failure. You should have your blood pressure rechecked by a primary care provider within the next 4 weeks.  If your provider in the emergency department today gave you specific instructions to follow-up with your doctor or provider even sooner than that, you should follow that instruction and not wait for up to 4 weeks for your follow-up visit.        Thank you for choosing [facility]       Thank you for choosing [facility] for your care. Our goal is always to provide you with excellent care. Hearing back from our patients is  "one way we can continue to improve our services. Please take a few minutes to complete the written survey that you may receive in the mail after you visit with us. Thank you!        [employer] Information     [employer] lets you send messages to your doctor, view your test results, renew your prescriptions, schedule appointments and more. To sign up, go to [email or web address] . Click on \"Log in\" on the left side of the screen, which will take you to the Welcome page. Then click on \"Sign up Now\" on the right side of the page.     You will be asked to enter the access code listed below, as well as some personal information. Please follow the directions to create your username and password.     Your access code is: [email or web address]  Expires: 2018  3:33 PM     Your access code will  in 90 days. If you need help or a new code, please call your [facility] clinic or [phone].        Care EveryWhere ID     This is your Care EveryWhere ID. This could be used by other organizations to access your [facility] medical records  [id number]        Equal Access to Services     ZACHARY LANGFORD : Hadjoaquim Tejada, harish martinez, oma haro, lokesh alonzo . So [facility] [phone].    ATENCIÓN: Si habla español, tiene a haji disposición servicios gratuitos de asistencia lingüística. Urbano al [phone].    We comply with applicable federal civil rights laws and [place] laws. We do not discriminate on the basis of race, color, national origin, age, disability, sex, sexual orientation, or gender identity.            After Visit Summary       This is your record. Keep this with you and show to your community pharmacist(s) and doctor(s) at your next visit.                  "

## 2018-04-08 NOTE — ED NOTES
"Pt given water to drink. Declined offer of food or medication. Pt states \"I think things are starting to wear off\".  "

## 2018-04-08 NOTE — ED NOTES
Seclusion discontinued. Patient maintains he would like to stay in his room, informed that is ok. He is no longer trying to get under garage doors, is able to verbally communicate needs to staff

## 2018-04-08 NOTE — ED NOTES
"Patient offered PO Zyprexa, he states \"yes I want the med\". Patient was asked to move away from the door so staff could open the door, patient refused to move away from the door stating \"no nobody is coming in here, just slide the pill under the door to me\". When informed I cannot slide the pill under the door to him the patient states \"never mind I don't want it\"  "

## 2018-04-08 NOTE — ED NOTES
"Bed: BH3  Expected date:   Expected time:   Means of arrival:   Comments:  Triage \"tripping out\" etoh 2.0    "

## 2018-04-08 NOTE — ED NOTES
Report received. Assumed care of pt. Pt previously on Health Officer Hold with security present and will remain on that while in the ED. Belongings done by previous staff. Pt remains laying on the floor on the mattress in front of the door.

## 2018-04-08 NOTE — ED NOTES
The patient was signed out to me at 6 AM by Dr. Diego.  He has been largely sleeping on a mattress next to the door the entire shift.  DEC is gone in on a number of occasions to see if he would talk with them but he  just shuts the door.  The nurse talked with him and he has still having some delusional thoughts.  He notes that he thinks he may be getting better.  He did walk to the bathroom but did not get a urine sample.  As he has been cooperative and sleeping I will allow him to keep doing so in hopes that the drug effect and alcohol effect may be wearing off and that he would improve.  Patient will be signed out to Dr. Marc and still needs DEC evaluation when cooperative.     Emma Dejesus MD  04/08/18 1820

## 2018-04-28 ENCOUNTER — HOSPITAL ENCOUNTER (EMERGENCY)
Facility: CLINIC | Age: 38
Discharge: ANOTHER HOSPITAL | End: 2018-04-29
Attending: FAMILY MEDICINE | Admitting: FAMILY MEDICINE
Payer: COMMERCIAL

## 2018-04-28 DIAGNOSIS — F19.10 POLYSUBSTANCE ABUSE (H): ICD-10-CM

## 2018-04-28 DIAGNOSIS — R45.6 VIOLENT BEHAVIOR: ICD-10-CM

## 2018-04-28 LAB
ALCOHOL BREATH TEST: 0 (ref 0–0.01)
AMPHETAMINES UR QL SCN: POSITIVE
BARBITURATES UR QL: NEGATIVE
BENZODIAZ UR QL: POSITIVE
CANNABINOIDS UR QL SCN: NEGATIVE
COCAINE UR QL: NEGATIVE
ETHANOL UR QL SCN: NEGATIVE
OPIATES UR QL SCN: POSITIVE

## 2018-04-28 PROCEDURE — 90791 PSYCH DIAGNOSTIC EVALUATION: CPT

## 2018-04-28 PROCEDURE — 80320 DRUG SCREEN QUANTALCOHOLS: CPT | Performed by: FAMILY MEDICINE

## 2018-04-28 PROCEDURE — 96372 THER/PROPH/DIAG INJ SC/IM: CPT | Performed by: FAMILY MEDICINE

## 2018-04-28 PROCEDURE — 99283 EMERGENCY DEPT VISIT LOW MDM: CPT | Mod: Z6 | Performed by: FAMILY MEDICINE

## 2018-04-28 PROCEDURE — 82075 ASSAY OF BREATH ETHANOL: CPT | Performed by: FAMILY MEDICINE

## 2018-04-28 PROCEDURE — 25000128 H RX IP 250 OP 636

## 2018-04-28 PROCEDURE — 99285 EMERGENCY DEPT VISIT HI MDM: CPT | Mod: 25 | Performed by: FAMILY MEDICINE

## 2018-04-28 PROCEDURE — 80307 DRUG TEST PRSMV CHEM ANLYZR: CPT | Performed by: FAMILY MEDICINE

## 2018-04-28 RX ORDER — HALOPERIDOL 5 MG/ML
10 INJECTION INTRAMUSCULAR ONCE
Status: COMPLETED | OUTPATIENT
Start: 2018-04-28 | End: 2018-04-28

## 2018-04-28 RX ORDER — LORAZEPAM 2 MG/ML
1 INJECTION INTRAMUSCULAR ONCE
Status: COMPLETED | OUTPATIENT
Start: 2018-04-28 | End: 2018-04-28

## 2018-04-28 RX ORDER — LORAZEPAM 2 MG/ML
INJECTION INTRAMUSCULAR
Status: COMPLETED
Start: 2018-04-28 | End: 2018-04-28

## 2018-04-28 RX ORDER — HALOPERIDOL 5 MG/ML
INJECTION INTRAMUSCULAR
Status: COMPLETED
Start: 2018-04-28 | End: 2018-04-28

## 2018-04-28 RX ADMIN — HALOPERIDOL LACTATE 10 MG: 5 INJECTION, SOLUTION INTRAMUSCULAR at 12:16

## 2018-04-28 RX ADMIN — HALOPERIDOL 10 MG: 5 INJECTION INTRAMUSCULAR at 12:16

## 2018-04-28 RX ADMIN — LORAZEPAM 1 MG: 2 INJECTION INTRAMUSCULAR; INTRAVENOUS at 12:16

## 2018-04-28 RX ADMIN — LORAZEPAM 1 MG: 2 INJECTION INTRAMUSCULAR at 12:16

## 2018-04-28 NOTE — ED NOTES
Pt attempted to elope ED, by running out of room towards entrance. Refused to stop when directed by staff. Security notified and manually walked pt back to room 14 in ED.
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
## 2018-04-28 NOTE — ED NOTES
Pt verbally agreed to discontinuation criteria. Appeared still slightly restless, but not aggressive or agitated. Pt requested to use restroom, staff obliged and released restraints. No aggression observed. Pt remained compliant.

## 2018-04-28 NOTE — ED NOTES
In person face to face assessment completed, including an evaluation of the patient's immediate reaction to the intervention, complete review of systems assessment, behavioral assessment and review/assessment of history, drugs and medications, recent labs, etc., and behavioral condition.  The patient experienced: No adverse physical outcome from seclusion/restraint initiation.  The intervention of restraint or seclusion needs to terminate.     Ángel Catherine MD  04/28/18 7965
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
## 2018-04-28 NOTE — ED NOTES
Pt asleep at this time. No behaviors present dt this. Seclusion discontinued. Will reassess need when pt awakes.
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
## 2018-04-28 NOTE — ED NOTES
After brief seclusion after attempt to run, the pt is sleeping. There are no injuries or sequelae.  Will now allow to metabolize and then re evaluate     Ángel Catherine MD  04/28/18 5905

## 2018-04-28 NOTE — ED NOTES
This pt is not suicidal. Has never been suicidal. He does not need one to one. He needs time to metabolize his methamphetamines     Ángel Catherine MD  04/28/18 5522

## 2018-04-28 NOTE — ED AVS SNAPSHOT
[facility], Emergency Department    [address]    [place] [address]    Phone:  [phone]    Fax:  [phone]                                       Sanket Jacques   MRN: [id number]    Department:  [facility], Emergency Department   Date of Visit:  4/28/2018           Patient Information     Date Of Birth          1980        Your diagnoses for this visit were:     Violent behavior     Polysubstance abuse        You were seen by Ángel Catherine MD and Sunita Patel MD.      24 Hour Appointment Hotline       To make an appointment at any [facility] clinic, call [phone] ([phone]). If you don't have a family doctor or clinic, we will help you find one. [facility] clinics are conveniently located to serve the needs of you and your family.             Review of your medicines      Our records show that you are taking the medicines listed below. If these are incorrect, please call your family doctor or clinic.        Dose / Directions Last dose taken    citalopram 10 MG tablet   Commonly known as:  celeXA   Dose:  10 mg   Quantity:  30 tablet        Take 1 tablet by mouth daily.   Refills:  3        HYDROcodone-acetaminophen 5-325 MG per tablet   Commonly known as:  NORCO   Dose:  1 tablet   Quantity:  20 tablet        Take 1 tablet by mouth every 6 hours as needed for pain. Last refill.  Needs to be seen in clinic for recheck for next refill.   Refills:  0        oxyCODONE-acetaminophen 5-325 MG per tablet   Commonly known as:  PERCOCET   Dose:  1-2 tablet   Quantity:  20 tablet        Take 1-2 tablets by mouth every 6 hours as needed for pain.   Refills:  0                Procedures and tests performed during your visit     Alcohol breath test POCT    Drug abuse screen 6 urine (tox)    Restraints Violent or Self-Destructive Adult (Age 18 or Older)      Orders Needing Specimen Collection     None      Pending Results     No orders found for last 3 day(s).            Pending Culture  "Results     No orders found for last 3 day(s).            Pending Results Instructions     If you had any lab results that were not finalized at the time of your Discharge, you can call the ED Lab Result RN at [phone]. You will be contacted by this team for any positive Lab results or changes in treatment. The nurses are available 7 days a week from 10A to 6:30P.  You can leave a message 24 hours per day and they will return your call.        Thank you for choosing [facility]       Thank you for choosing [facility] for your care. Our goal is always to provide you with excellent care. Hearing back from our patients is one way we can continue to improve our services. Please take a few minutes to complete the written survey that you may receive in the mail after you visit with us. Thank you!        [employer] Information     [employer] [employer] lets you send messages to your doctor, view your test results, renew your prescriptions, schedule appointments and more. To sign up, go to [email or web address] . Click on \"Log in\" on the left side of the screen, which will take you to the Welcome page. Then click on \"Sign up Now\" on the right side of the page.     You will be asked to enter the access code listed below, as well as some personal information. Please follow the directions to create your username and password.     Your access code is: [email or web address]  Expires: 2018  3:33 PM     Your access code will  in 90 days. If you need help or a new code, please call your [facility] clinic or [phone].        Care EveryWhere ID     This is your Care EveryWhere ID. This could be used by other organizations to access your [facility] medical records  [id number]        Equal Access to Services     ZACHARY LANGFORD : Kiley Tejada, harish martinez, lokesh kang. So [facility] [phone].    ATENCIÓN: Si habla español, tiene a haji disposición servicios gratuitos de " asistencia lingüística. Urbano al [phone].    We comply with applicable federal civil rights laws and [place] laws. We do not discriminate on the basis of race, color, national origin, age, disability, sex, sexual orientation, or gender identity.            After Visit Summary       This is your record. Keep this with you and show to your community pharmacist(s) and doctor(s) at your next visit.

## 2018-04-28 NOTE — ED NOTES
Nurse removed restraints and immediately the pt took off running. He was caught without sequelae.  Placed in seclusion.       Ángel Catherine MD  04/28/18 9085

## 2018-04-28 NOTE — ED AVS SNAPSHOT
[facility], [place], Emergency Department    [address]    [facility] [address]    Phone:  [phone]    Fax:  [phone]                                       Sanket Jacques   MRN: [id number]    Department:  [facility], Emergency Department   Date of Visit:  4/28/2018           After Visit Summary Signature Page     I have received my discharge instructions, and my questions have been answered. I have discussed any challenges I see with this plan with the nurse or doctor.    ..........................................................................................................................................  Patient/Patient Representative Signature      ..........................................................................................................................................  Patient Representative Print Name and Relationship to Patient    ..................................................               ................................................  Date                                            Time    ..........................................................................................................................................  Reviewed by Signature/Title    ...................................................              ..............................................  Date                                                            Time

## 2018-04-29 VITALS
TEMPERATURE: 97.8 F | OXYGEN SATURATION: 97 % | HEART RATE: 107 BPM | RESPIRATION RATE: 16 BRPM | DIASTOLIC BLOOD PRESSURE: 83 MMHG | SYSTOLIC BLOOD PRESSURE: 120 MMHG

## 2018-04-29 NOTE — ED PROVIDER NOTES
Sign out from Dr. Catherine at 5:15pm    Situation:  36 yo M with a hx of polysubstance abuse presenting with agitation. Required IM haldol, ativan and restrain intimally. Now sleeping.   UDS + for amphetamine, benzodiazepines and opiates.     Plan: Awaiting pt to be clinically awake enough for DEC assessment.     Events:  8PM  Attempt of DEC assessment and pt not awake fully.    12:00AM  Pt reassessed. Awake and available for assessment.     1:04 AM   Pt seen by DEC , Leyla. Please see her note for additional details. Pt is interested in detox. Bed available at [facility]. Will fax information and await acceptance. No detox beds at [facility] or [address] [place] [address].     Signed out to Dr. Chau at 1:30AM pending completion of disposition.     MD MARYA Tong Katrina Anne, MD  04/29/18 0107

## 2018-05-19 ENCOUNTER — TRANSFERRED RECORDS (OUTPATIENT)
Dept: HEALTH INFORMATION MANAGEMENT | Facility: CLINIC | Age: 38
End: 2018-05-19

## 2018-06-15 ENCOUNTER — TRANSFERRED RECORDS (OUTPATIENT)
Dept: HEALTH INFORMATION MANAGEMENT | Facility: CLINIC | Age: 38
End: 2018-06-15

## 2018-06-16 ENCOUNTER — TRANSFERRED RECORDS (OUTPATIENT)
Dept: HEALTH INFORMATION MANAGEMENT | Facility: CLINIC | Age: 38
End: 2018-06-16